# Patient Record
Sex: MALE | Race: WHITE | Employment: FULL TIME | URBAN - METROPOLITAN AREA
[De-identification: names, ages, dates, MRNs, and addresses within clinical notes are randomized per-mention and may not be internally consistent; named-entity substitution may affect disease eponyms.]

---

## 2019-09-05 ENCOUNTER — HOSPITAL ENCOUNTER (OUTPATIENT)
Dept: GENERAL RADIOLOGY | Age: 24
Discharge: HOME | End: 2019-09-05
Payer: SELF-PAY

## 2019-09-05 DIAGNOSIS — S59.901A INJURY OF RIGHT ELBOW: ICD-10-CM

## 2019-09-05 PROCEDURE — 73080 X-RAY EXAM OF ELBOW: CPT

## 2019-10-03 ENCOUNTER — HOSPITAL ENCOUNTER (OUTPATIENT)
Dept: MRI IMAGING | Age: 24
Discharge: HOME | End: 2019-10-03
Payer: SELF-PAY

## 2019-10-03 DIAGNOSIS — S52.021A CLOSED FRACTURE OF RIGHT OLECRANON PROCESS: ICD-10-CM

## 2019-10-03 PROCEDURE — 73221 MRI JOINT UPR EXTREM W/O DYE: CPT

## 2019-11-12 ENCOUNTER — HOSPITAL ENCOUNTER (OUTPATIENT)
Dept: MRI IMAGING | Age: 24
Discharge: HOME | End: 2019-11-12
Payer: SELF-PAY
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
## 2019-11-12 DIAGNOSIS — M22.41 CHONDROMALACIA OF RIGHT PATELLA: ICD-10-CM

## 2019-11-12 PROCEDURE — 73721 MRI JNT OF LWR EXTRE W/O DYE: CPT

## 2021-09-09 ENCOUNTER — APPOINTMENT (OUTPATIENT)
Dept: CT IMAGING | Age: 26
End: 2021-09-09
Attending: NURSE PRACTITIONER
Payer: MEDICAID

## 2021-09-09 ENCOUNTER — HOSPITAL ENCOUNTER (EMERGENCY)
Age: 26
Discharge: HOME | End: 2021-09-09
Attending: EMERGENCY MEDICINE
Payer: MEDICAID

## 2021-09-09 VITALS
OXYGEN SATURATION: 100 % | DIASTOLIC BLOOD PRESSURE: 99 MMHG | SYSTOLIC BLOOD PRESSURE: 159 MMHG | TEMPERATURE: 98.4 F | RESPIRATION RATE: 20 BRPM | HEART RATE: 114 BPM

## 2021-09-09 DIAGNOSIS — R10.9 ABDOMINAL WALL PAIN: Primary | ICD-10-CM

## 2021-09-09 LAB
ALBUMIN SERPL-MCNC: 4.8 G/DL (ref 3.5–5)
ALBUMIN/GLOB SERPL: 1.4 {RATIO} (ref 1.1–2.2)
ALP SERPL-CCNC: 89 U/L (ref 45–117)
ALT SERPL-CCNC: 25 U/L (ref 12–78)
ANION GAP SERPL CALC-SCNC: 5 MMOL/L (ref 5–15)
APPEARANCE UR: CLEAR
AST SERPL-CCNC: 20 U/L (ref 15–37)
BACTERIA URNS QL MICRO: NEGATIVE /HPF
BASOPHILS # BLD: 0.1 K/UL (ref 0–0.1)
BASOPHILS NFR BLD: 0 % (ref 0–1)
BILIRUB SERPL-MCNC: 0.7 MG/DL (ref 0.2–1)
BILIRUB UR QL: NEGATIVE
BUN SERPL-MCNC: 12 MG/DL (ref 6–20)
BUN/CREAT SERPL: 11 (ref 12–20)
CALCIUM SERPL-MCNC: 9.3 MG/DL (ref 8.5–10.1)
CHLORIDE SERPL-SCNC: 107 MMOL/L (ref 97–108)
CO2 SERPL-SCNC: 26 MMOL/L (ref 21–32)
COLOR UR: ABNORMAL
CREAT SERPL-MCNC: 1.09 MG/DL (ref 0.7–1.3)
DIFFERENTIAL METHOD BLD: ABNORMAL
EOSINOPHIL # BLD: 0.1 K/UL (ref 0–0.4)
EOSINOPHIL NFR BLD: 1 % (ref 0–7)
EPITH CASTS URNS QL MICRO: ABNORMAL /LPF
ERYTHROCYTE [DISTWIDTH] IN BLOOD BY AUTOMATED COUNT: 11.3 % (ref 11.5–14.5)
GLOBULIN SER CALC-MCNC: 3.5 G/DL (ref 2–4)
GLUCOSE SERPL-MCNC: 75 MG/DL (ref 65–100)
GLUCOSE UR STRIP.AUTO-MCNC: NEGATIVE MG/DL
HCT VFR BLD AUTO: 48.7 % (ref 36.6–50.3)
HGB BLD-MCNC: 17.2 G/DL (ref 12.1–17)
HGB UR QL STRIP: NEGATIVE
HYALINE CASTS URNS QL MICRO: ABNORMAL /LPF (ref 0–5)
IMM GRANULOCYTES # BLD AUTO: 0.1 K/UL (ref 0–0.04)
IMM GRANULOCYTES NFR BLD AUTO: 1 % (ref 0–0.5)
KETONES UR QL STRIP.AUTO: 15 MG/DL
LEUKOCYTE ESTERASE UR QL STRIP.AUTO: NEGATIVE
LIPASE SERPL-CCNC: 86 U/L (ref 73–393)
LYMPHOCYTES # BLD: 1.7 K/UL (ref 0.8–3.5)
LYMPHOCYTES NFR BLD: 14 % (ref 12–49)
MCH RBC QN AUTO: 32.2 PG (ref 26–34)
MCHC RBC AUTO-ENTMCNC: 35.3 G/DL (ref 30–36.5)
MCV RBC AUTO: 91.2 FL (ref 80–99)
MONOCYTES # BLD: 0.5 K/UL (ref 0–1)
MONOCYTES NFR BLD: 5 % (ref 5–13)
NEUTS SEG # BLD: 9.6 K/UL (ref 1.8–8)
NEUTS SEG NFR BLD: 79 % (ref 32–75)
NITRITE UR QL STRIP.AUTO: NEGATIVE
NRBC # BLD: 0 K/UL (ref 0–0.01)
NRBC BLD-RTO: 0 PER 100 WBC
PH UR STRIP: 8 [PH] (ref 5–8)
PLATELET # BLD AUTO: 177 K/UL (ref 150–400)
PMV BLD AUTO: 10.5 FL (ref 8.9–12.9)
POTASSIUM SERPL-SCNC: 3.4 MMOL/L (ref 3.5–5.1)
PROT SERPL-MCNC: 8.3 G/DL (ref 6.4–8.2)
PROT UR STRIP-MCNC: NEGATIVE MG/DL
RBC # BLD AUTO: 5.34 M/UL (ref 4.1–5.7)
RBC #/AREA URNS HPF: ABNORMAL /HPF (ref 0–5)
SODIUM SERPL-SCNC: 138 MMOL/L (ref 136–145)
SP GR UR REFRACTOMETRY: 1.02 (ref 1–1.03)
UR CULT HOLD, URHOLD: NORMAL
UROBILINOGEN UR QL STRIP.AUTO: 0.2 EU/DL (ref 0.2–1)
WBC # BLD AUTO: 12 K/UL (ref 4.1–11.1)
WBC URNS QL MICRO: ABNORMAL /HPF (ref 0–4)

## 2021-09-09 PROCEDURE — 81001 URINALYSIS AUTO W/SCOPE: CPT

## 2021-09-09 PROCEDURE — 83690 ASSAY OF LIPASE: CPT

## 2021-09-09 PROCEDURE — 74177 CT ABD & PELVIS W/CONTRAST: CPT

## 2021-09-09 PROCEDURE — 85025 COMPLETE CBC W/AUTO DIFF WBC: CPT

## 2021-09-09 PROCEDURE — 36415 COLL VENOUS BLD VENIPUNCTURE: CPT

## 2021-09-09 PROCEDURE — 99283 EMERGENCY DEPT VISIT LOW MDM: CPT

## 2021-09-09 PROCEDURE — 74011000636 HC RX REV CODE- 636: Performed by: RADIOLOGY

## 2021-09-09 PROCEDURE — 80053 COMPREHEN METABOLIC PANEL: CPT

## 2021-09-09 RX ORDER — SENNOSIDES 25 MG/1
TABLET, FILM COATED ORAL
Qty: 28 G | Refills: 0 | Status: SHIPPED | OUTPATIENT
Start: 2021-09-09 | End: 2021-09-19

## 2021-09-09 RX ORDER — DICLOFENAC SODIUM 75 MG/1
75 TABLET, DELAYED RELEASE ORAL
Qty: 30 TABLET | Refills: 0 | Status: SHIPPED | OUTPATIENT
Start: 2021-09-09 | End: 2021-09-24

## 2021-09-09 RX ADMIN — IOPAMIDOL 100 ML: 755 INJECTION, SOLUTION INTRAVENOUS at 16:25

## 2021-09-09 NOTE — ED TRIAGE NOTES
C/o left lower abdominal pain radiating into testicals. Started after lifting his gf. Worse with cough.

## 2021-09-09 NOTE — Clinical Note
[address]  [address] [facility] [address]  [phone]    Work/School Note    Date: 9/9/2021    To Whom It May concern:    Henrietta Ritter was seen and treated today in the emergency room by the following provider(s):  Attending Provider: Dominick Westfall MD  Nurse Practitioner: Nati Paul NP. Henrietta Ritter is excused from work/school on 09/09/21 and 09/10/21. He is medically clear to return to work/school on 9/11/2021.        Sincerely,          Brayden Mott NP

## 2021-09-09 NOTE — ED PROVIDER NOTES
56-year-old male with no past medical or surgical history presents ER today for evaluation of abdominal pain. Patient states that he has been experiencing intermittent left-sided abdominal pain, left leg pain, and left testicular pain since Saturday of last week after he had a forceful coughing spell. He feels like the symptoms seem to improve when laying in the supine position and seem to become worse when flexing at the torso/changing positions. He took Advil for the pain, which seemed to help. He denies any nausea, vomiting, fever, genitourinary complaints, testicular swelling, or history of nephrolithiasis. He smokes marijuana and drinks socially. Of note, he does report history of somewhat similar symptoms about 1 year ago. Patient is thinks that he might have a hernia. No past medical history on file. No past surgical history on file. No family history on file. Social History     Socioeconomic History    Marital status: SINGLE     Spouse name: Not on file    Number of children: Not on file    Years of education: Not on file    Highest education level: Not on file   Occupational History    Not on file   Tobacco Use    Smoking status: Not on file   Substance and Sexual Activity    Alcohol use: Not on file    Drug use: Not on file    Sexual activity: Not on file   Other Topics Concern    Not on file   Social History Narrative    Not on file     Social Determinants of Health     Financial Resource Strain:     Difficulty of Paying Living Expenses:    Food Insecurity:     Worried About Running Out of Food in the Last Year:     [address] in the Last Year:    Transportation Needs:     Lack of Transportation (Medical):      Lack of Transportation (Non-Medical):    Physical Activity:     Days of Exercise per Week:     Minutes of Exercise per Session:    Stress:     Feeling of Stress :    Social Connections:     Frequency of Communication with Friends and Family:     Frequency of Social Gatherings with Friends and Family:     Attends Church Services:     Active Member of Clubs or Organizations:     Attends Club or Organization Meetings:     Marital Status:    Intimate Partner Violence:     Fear of Current or Ex-Partner:     Emotionally Abused:     Physically Abused:     Sexually Abused: ALLERGIES: Patient has no allergy information on record. Review of Systems   Constitutional: Negative for fever. HENT: Negative for sore throat. Eyes: Negative for visual disturbance. Respiratory: Negative for shortness of breath. Cardiovascular: Negative for palpitations. Gastrointestinal: Positive for abdominal pain. Negative for vomiting. Genitourinary: Negative for dysuria, penile swelling and scrotal swelling. Musculoskeletal: Negative for myalgias. Skin: Negative for rash. Neurological: Negative for dizziness. Psychiatric/Behavioral: Negative for dysphoric mood. There were no vitals filed for this visit. Physical Exam  Vitals and nursing note reviewed. Constitutional:       General: He is not in acute distress. Appearance: Normal appearance. He is not ill-appearing. HENT:      Head: Normocephalic and atraumatic. Nose: Nose normal.      Mouth/Throat:      Mouth: Mucous membranes are moist.      Pharynx: Oropharynx is clear. Eyes:      Extraocular Movements: Extraocular movements intact. Cardiovascular:      Rate and Rhythm: Normal rate and regular rhythm. Pulses: Normal pulses. Heart sounds: Normal heart sounds. Pulmonary:      Effort: Pulmonary effort is normal.      Breath sounds: Normal breath sounds. Abdominal:      General: Abdomen is flat. Bowel sounds are normal.      Palpations: Abdomen is soft. Tenderness: There is abdominal tenderness in the left lower quadrant. There is no right CVA tenderness, left CVA tenderness, guarding or rebound. Hernia: No hernia is present.  There is no hernia in the left inguinal area, right femoral area, left femoral area or right inguinal area. Comments: Only minimal tenderness to deep palpation of the left lower abdomen. Normal cremasteric reflex. No palpable inguinal or femoral hernia with active Valsalva. Musculoskeletal:         General: Normal range of motion. Cervical back: Normal range of motion and neck supple. Skin:     General: Skin is warm and dry. Neurological:      Mental Status: He is alert and oriented to person, place, and time. Mental status is at baseline. Psychiatric:         Mood and Affect: Mood normal.         Behavior: Behavior normal.          MDM      VITAL SIGNS:  Patient Vitals for the past 4 hrs:   Pulse Resp BP SpO2   09/09/21 1641 (!) 114 20 (!) 159/99 100 %         LABS:  Recent Results (from the past 6 hour(s))   CBC WITH AUTOMATED DIFF    Collection Time: 09/09/21  3:17 PM   Result Value Ref Range    WBC 12.0 (H) 4.1 - 11.1 K/uL    RBC 5.34 4.10 - 5.70 M/uL    HGB 17.2 (H) 12.1 - 17.0 g/dL    HCT 48.7 36.6 - 50.3 %    MCV 91.2 80.0 - 99.0 FL    MCH 32.2 26.0 - 34.0 PG    MCHC 35.3 30.0 - 36.5 g/dL    RDW 11.3 (L) 11.5 - 14.5 %    PLATELET 237 397 - 221 K/uL    MPV 10.5 8.9 - 12.9 FL    NRBC 0.0 0  WBC    ABSOLUTE NRBC 0.00 0.00 - 0.01 K/uL    NEUTROPHILS 79 (H) 32 - 75 %    LYMPHOCYTES 14 12 - 49 %    MONOCYTES 5 5 - 13 %    EOSINOPHILS 1 0 - 7 %    BASOPHILS 0 0 - 1 %    IMMATURE GRANULOCYTES 1 (H) 0.0 - 0.5 %    ABS. NEUTROPHILS 9.6 (H) 1.8 - 8.0 K/UL    ABS. LYMPHOCYTES 1.7 0.8 - 3.5 K/UL    ABS. MONOCYTES 0.5 0.0 - 1.0 K/UL    ABS. EOSINOPHILS 0.1 0.0 - 0.4 K/UL    ABS. BASOPHILS 0.1 0.0 - 0.1 K/UL    ABS. IMM.  GRANS. 0.1 (H) 0.00 - 0.04 K/UL    DF AUTOMATED     METABOLIC PANEL, COMPREHENSIVE    Collection Time: 09/09/21  3:17 PM   Result Value Ref Range    Sodium 138 136 - 145 mmol/L    Potassium 3.4 (L) 3.5 - 5.1 mmol/L    Chloride 107 97 - 108 mmol/L    CO2 26 21 - 32 mmol/L    Anion gap 5 5 - 15 mmol/L    Glucose 75 65 - 100 mg/dL    BUN 12 6 - 20 MG/DL    Creatinine 1.09 0.70 - 1.30 MG/DL    BUN/Creatinine ratio 11 (L) 12 - 20      GFR est AA >60 >60 ml/min/1.73m2    GFR est non-AA >60 >60 ml/min/1.73m2    Calcium 9.3 8.5 - 10.1 MG/DL    Bilirubin, total 0.7 0.2 - 1.0 MG/DL    ALT (SGPT) 25 12 - 78 U/L    AST (SGOT) 20 15 - 37 U/L    Alk. phosphatase 89 45 - 117 U/L    Protein, total 8.3 (H) 6.4 - 8.2 g/dL    Albumin 4.8 3.5 - 5.0 g/dL    Globulin 3.5 2.0 - 4.0 g/dL    A-G Ratio 1.4 1.1 - 2.2     LIPASE    Collection Time: 09/09/21  3:17 PM   Result Value Ref Range    Lipase 86 73 - 393 U/L   URINALYSIS W/MICROSCOPIC    Collection Time: 09/09/21  3:17 PM   Result Value Ref Range    Color YELLOW/STRAW      Appearance CLEAR CLEAR      Specific gravity 1.020 1.003 - 1.030      pH (UA) 8.0 5.0 - 8.0      Protein Negative NEG mg/dL    Glucose Negative NEG mg/dL    Ketone 15 (A) NEG mg/dL    Bilirubin Negative NEG      Blood Negative NEG      Urobilinogen 0.2 0.2 - 1.0 EU/dL    Nitrites Negative NEG      Leukocyte Esterase Negative NEG      WBC 0-4 0 - 4 /hpf    RBC 0-5 0 - 5 /hpf    Epithelial cells FEW FEW /lpf    Bacteria Negative NEG /hpf    Hyaline cast 0-2 0 - 5 /lpf   URINE CULTURE HOLD SAMPLE    Collection Time: 09/09/21  3:17 PM    Specimen: Serum; Urine   Result Value Ref Range    Urine culture hold        Urine on hold in Microbiology dept for 2 days. If unpreserved urine is submitted, it cannot be used for addtional testing after 24 hours, recollection will be required. IMAGING:  CT ABD PELV W CONT   Final Result      1. No acute abdominal or pelvic abnormality. Medications During Visit:  Medications   iopamidoL (ISOVUE-370) 76 % injection 100 mL (100 mL IntraVENous Given 9/9/21 6405)         DECISION MAKING:  Elliot Odonnell is a 31 y.o. male who comes in as above. Negative workup. Suspect abdominal wall pain based on exam and history.   Recommended lidocaine patches + nsaids for pain control and PCP f/u. The clinical decision making for this encounter included ordering and interpreting the above diagnostic tests with comparison to prior studies that are within our EMR. Past medical and surgical histories were reviewed, as were records from recent outpatient and emergency department visits. The above results discussed and reviewed with the patient. Patient verbalized understanding of the care plan, including any changes to current outpatient medication regimen, discussed disease process, symptom control, and follow-up care. Return precautions reviewed. IMPRESSION:  1. Abdominal wall pain        DISPOSITION:  Discharged      Current Discharge Medication List      START taking these medications    Details   diclofenac EC (VOLTAREN) 75 mg EC tablet Take 1 Tablet by mouth two (2) times daily as needed for Pain for up to 15 days. Qty: 30 Tablet, Refills: 0  Start date: 9/9/2021, End date: 9/24/2021              Follow-up Information     Follow up With Specialties Details [address]    [address] [id number] [phone]            The patient is asked to follow-up with their primary care provider in the next several days. They are to call tomorrow for an appointment. The patient is asked to return promptly for any increased concerns or worsening of symptoms. They can return to this emergency department or any other emergency department.       Procedures